# Patient Record
Sex: FEMALE | Race: WHITE | Employment: PART TIME | URBAN - METROPOLITAN AREA
[De-identification: names, ages, dates, MRNs, and addresses within clinical notes are randomized per-mention and may not be internally consistent; named-entity substitution may affect disease eponyms.]

---

## 2021-06-09 ENCOUNTER — OFFICE VISIT (OUTPATIENT)
Dept: FAMILY MEDICINE CLINIC | Age: 56
End: 2021-06-09
Payer: MEDICAID

## 2021-06-09 VITALS
BODY MASS INDEX: 19.05 KG/M2 | OXYGEN SATURATION: 97 % | RESPIRATION RATE: 17 BRPM | HEIGHT: 64 IN | SYSTOLIC BLOOD PRESSURE: 119 MMHG | DIASTOLIC BLOOD PRESSURE: 83 MMHG | WEIGHT: 111.6 LBS | HEART RATE: 75 BPM | TEMPERATURE: 97.8 F

## 2021-06-09 DIAGNOSIS — F41.9 ANXIETY: Primary | ICD-10-CM

## 2021-06-09 DIAGNOSIS — Z01.419 ENCOUNTER FOR GYNECOLOGICAL EXAMINATION WITHOUT ABNORMAL FINDING: ICD-10-CM

## 2021-06-09 DIAGNOSIS — Z12.31 ENCOUNTER FOR SCREENING MAMMOGRAM FOR MALIGNANT NEOPLASM OF BREAST: ICD-10-CM

## 2021-06-09 DIAGNOSIS — F17.218 CIGARETTE NICOTINE DEPENDENCE WITH OTHER NICOTINE-INDUCED DISORDER: ICD-10-CM

## 2021-06-09 DIAGNOSIS — Z13.6 SCREENING FOR ISCHEMIC HEART DISEASE: ICD-10-CM

## 2021-06-09 DIAGNOSIS — Z12.11 COLON CANCER SCREENING: ICD-10-CM

## 2021-06-09 DIAGNOSIS — B00.1 COLD SORE: ICD-10-CM

## 2021-06-09 PROCEDURE — 99204 OFFICE O/P NEW MOD 45 MIN: CPT | Performed by: FAMILY MEDICINE

## 2021-06-09 RX ORDER — LAMOTRIGINE 100 MG/1
100 TABLET ORAL
COMMUNITY
Start: 2021-05-06 | End: 2021-06-09 | Stop reason: SDUPTHER

## 2021-06-09 RX ORDER — VENLAFAXINE HYDROCHLORIDE 150 MG/1
150 CAPSULE, EXTENDED RELEASE ORAL DAILY
Qty: 90 CAPSULE | Refills: 1 | Status: SHIPPED | OUTPATIENT
Start: 2021-06-09 | End: 2021-11-19 | Stop reason: SDUPTHER

## 2021-06-09 RX ORDER — LAMOTRIGINE 100 MG/1
100 TABLET, EXTENDED RELEASE ORAL DAILY
Qty: 90 TABLET | Refills: 1 | Status: SHIPPED | OUTPATIENT
Start: 2021-06-09 | End: 2021-11-19 | Stop reason: SDUPTHER

## 2021-06-09 RX ORDER — ACYCLOVIR 50 MG/G
OINTMENT TOPICAL
Qty: 2 G | Refills: 3 | Status: SHIPPED | OUTPATIENT
Start: 2021-06-09

## 2021-06-09 RX ORDER — VENLAFAXINE HYDROCHLORIDE 75 MG/1
75 CAPSULE, EXTENDED RELEASE ORAL
COMMUNITY
Start: 2021-05-06 | End: 2021-06-09 | Stop reason: SDUPTHER

## 2021-06-09 NOTE — PROGRESS NOTES
1. Have you been to the ER, urgent care clinic since your last visit? Hospitalized since your last visit? Yes, [facility] for med refills. 2. Have you seen or consulted any other health care providers outside of the [address] [place] [address] since your last visit? Include any pap smears or colon screening. No    Health Maintenance Due   Topic Date Due    Hepatitis C Screening  Never done    COVID-19 Vaccine (1) Never done    DTaP/Tdap/Td series (1 - Tdap) Never done    PAP AKA CERVICAL CYTOLOGY  Never done    Lipid Screen  Never done    Shingrix Vaccine Age 50> (1 of 2) Never done    Colorectal Cancer Screening Combo  Never done    Breast Cancer Screen Mammogram  Never done     Chief Complaint   Patient presents with    Establish Care     Pt wanting to have meds refilled    Referral Request     For Psychiatrist      Pt also wants a refill on medications.

## 2021-06-09 NOTE — PROGRESS NOTES
HPI  Mara Boyd is a 50 y.o. female who presents to establish care. She has been in the area for 4 years but has not established PCP. Was going and [facility].  She has depression and anger issues. Has been stable on venlafaxine 75 and Lamictal 100 mg for 8 years. Prior to that tried a few different medications. Has been on something for a total of 12 years. She is smoking and would like to try quitting. She identifies habit as her primary barrier. She quit cold turkey for 20 years but resumed smoking when she was 22. She is currently stressed, identifies this as her living situation, lives with somebody was narcissistic and does not feel like she can relax at home. Feels jittery all the time. PMHx:  History reviewed. No pertinent past medical history. Meds:   Current Outpatient Medications   Medication Sig Dispense Refill    venlafaxine-SR (EFFEXOR-XR) 150 mg capsule Take 1 Capsule by mouth daily. 90 Capsule 1    lamoTRIgine (LaMICtal XR) 100 mg tr24 ER tablet Take 1 Tablet by mouth daily. 90 Tablet 1    acyclovir (ZOVIRAX) 5 % ointment Apply  to affected area every three (3) hours. 2 g 3       Allergies:   Not on File    Smoker:  Social History     Tobacco Use   Smoking Status Current Every Day Smoker    Packs/day: 1.00    Types: Cigarettes   Smokeless Tobacco Current User   Tobacco Comment    Pt states she needs to stop       ETOH:   Social History     Substance and Sexual Activity   Alcohol Use Never       FH:   Family History   Problem Relation Age of Onset    Diabetes Mother     Heart Disease Mother     Heart Disease Father        ROS:   As listed in HPI.  In addition:  Constitutional:   No headache, fever, fatigue, weight loss or weight gain      Cardiac:    No chest pain      Resp:   No cough or shortness of breath      Neuro   No loss of consciousness, dizziness, seizures      Physical Exam:  Blood pressure 119/83, pulse 75, temperature 97.8 °F (36.6 °C), temperature source Oral, resp. rate 17, height 5' 4\" (1.626 m), weight 111 lb 9.6 oz (50.6 kg), SpO2 97 %. GEN: No apparent distress. Alert and oriented and responds to all questions appropriately. NEUROLOGIC:  No focal neurologic deficits. Strength and sensation grossly intact. Coordination and gait grossly intact. EXT: Well perfused. No edema. SKIN: No obvious rashes. Lungs clear to auscultation bilaterally  CV regular rhythm no murmur       Assessment and Plan     Anxiety, anger  Venlafaxine 75 mgincrease this to 150  Lamictal 100 mg  She said that when she does not take her Lamictal she can feel mood swings coming on. Does not sound like true shamir but needs to be cautious about this with dose increase of the venlafaxine. Needs to see psychiatrist, she will make some calls    Refer for gynecology, plans to get Pap smears and mammograms there. She is overdue for colonoscopy. Evidently had one done in her 45s for some problem and was told to come back in 5 years. Has cold sore right upper lip on occasion, responded well to acyclovir cream in the past.    Smoking  Encouraged her to quit. I think Wellbutrin would interact with her current medications and Chantix when we were reviewing the side effects she wanted to avoid that      ICD-10-CM ICD-9-CM    1. Anxiety  F41.9 300.00 LIPID PANEL      CBC WITH AUTOMATED DIFF      METABOLIC PANEL, COMPREHENSIVE      TSH 3RD GENERATION      TSH 3RD GENERATION      METABOLIC PANEL, COMPREHENSIVE      CBC WITH AUTOMATED DIFF      LIPID PANEL   2. Screening for ischemic heart disease  Z13.6 V81.0    3. Colon cancer screening  Z12.11 V76.51 REFERRAL FOR COLONOSCOPY   4. Encounter for gynecological examination without abnormal finding  Z01.419 V72.31 REFERRAL TO OBSTETRICS AND GYNECOLOGY   5.  Encounter for screening mammogram for malignant neoplasm of breast  Z12.31 V76.12 REFERRAL TO OBSTETRICS AND GYNECOLOGY   6. Cigarette nicotine dependence with other nicotine-induced disorder  F17.218 292.89    7. Cold sore  B00.1 054.9        AVS given. Pt expressed understanding of instructions  This was a 45-minute visit. Greater than 50% of the time was spent counseling the patient on anxiety, cold sore, smoking.

## 2021-11-19 RX ORDER — LAMOTRIGINE 100 MG/1
100 TABLET, EXTENDED RELEASE ORAL DAILY
Qty: 90 TABLET | Refills: 1 | Status: SHIPPED | OUTPATIENT
Start: 2021-11-19 | End: 2022-09-06

## 2021-11-19 RX ORDER — VENLAFAXINE HYDROCHLORIDE 150 MG/1
150 CAPSULE, EXTENDED RELEASE ORAL DAILY
Qty: 90 CAPSULE | Refills: 1 | Status: SHIPPED | OUTPATIENT
Start: 2021-11-19 | End: 2022-09-06

## 2021-11-19 NOTE — TELEPHONE ENCOUNTER
Refill Request (patient calling)    Pt last seen as a new pt in 06/2021  Pt states that the provider told her that she could get her RX's filled until she found another provider? Pt would like a call when RX's are sent to the pharmacy       Requested Prescriptions     Pending Prescriptions Disp Refills    venlafaxine-SR (EFFEXOR-XR) 150 mg capsule 90 Capsule 1     Sig: Take 1 Capsule by mouth daily.  lamoTRIgine (LaMICtal XR) 100 mg tr24 ER tablet 90 Tablet 1     Sig: Take 1 Tablet by mouth daily.

## 2022-09-06 RX ORDER — LAMOTRIGINE 100 MG/1
TABLET, EXTENDED RELEASE ORAL
Qty: 30 TABLET | Refills: 0 | Status: SHIPPED | OUTPATIENT
Start: 2022-09-06

## 2022-09-06 RX ORDER — VENLAFAXINE HYDROCHLORIDE 150 MG/1
CAPSULE, EXTENDED RELEASE ORAL
Qty: 30 CAPSULE | Refills: 0 | Status: SHIPPED | OUTPATIENT
Start: 2022-09-06

## 2022-12-22 RX ORDER — VENLAFAXINE HYDROCHLORIDE 150 MG/1
CAPSULE, EXTENDED RELEASE ORAL
Qty: 30 CAPSULE | Refills: 0 | Status: SHIPPED | OUTPATIENT
Start: 2022-12-22

## 2022-12-22 RX ORDER — LAMOTRIGINE 100 MG/1
TABLET, EXTENDED RELEASE ORAL
Qty: 30 TABLET | Refills: 0 | Status: SHIPPED | OUTPATIENT
Start: 2022-12-22

## 2022-12-22 NOTE — TELEPHONE ENCOUNTER
Unable to reach pt in regards to medications refilled and follow up appt needed. Left voicemail to call back to inform of medication refills and make appt.

## 2023-02-23 ENCOUNTER — VIRTUAL VISIT (OUTPATIENT)
Dept: FAMILY MEDICINE CLINIC | Age: 58
End: 2023-02-23
Payer: MEDICAID

## 2023-02-23 DIAGNOSIS — Z12.11 COLON CANCER SCREENING: ICD-10-CM

## 2023-02-23 DIAGNOSIS — Z51.81 ENCOUNTER FOR THERAPEUTIC DRUG MONITORING: ICD-10-CM

## 2023-02-23 DIAGNOSIS — E78.2 MIXED HYPERLIPIDEMIA: ICD-10-CM

## 2023-02-23 DIAGNOSIS — Z12.31 ENCOUNTER FOR SCREENING MAMMOGRAM FOR MALIGNANT NEOPLASM OF BREAST: ICD-10-CM

## 2023-02-23 DIAGNOSIS — F41.9 ANXIETY: Primary | ICD-10-CM

## 2023-02-23 DIAGNOSIS — F17.218 CIGARETTE NICOTINE DEPENDENCE WITH OTHER NICOTINE-INDUCED DISORDER: ICD-10-CM

## 2023-02-23 RX ORDER — VENLAFAXINE HYDROCHLORIDE 150 MG/1
150 CAPSULE, EXTENDED RELEASE ORAL DAILY
Qty: 90 CAPSULE | Refills: 1 | Status: SHIPPED | OUTPATIENT
Start: 2023-02-23

## 2023-02-23 RX ORDER — LAMOTRIGINE 100 MG/1
100 TABLET, EXTENDED RELEASE ORAL DAILY
Qty: 90 TABLET | Refills: 1 | Status: SHIPPED | OUTPATIENT
Start: 2023-02-23

## 2023-02-23 NOTE — PROGRESS NOTES
Health Maintenance Due   Topic Date Due    Hepatitis C Screening  Never done    COVID-19 Vaccine (1) Never done    Pneumococcal 0-64 years (1 - PCV) Never done    Cervical cancer screen  Never done    Lipid Screen  Never done    Colorectal Cancer Screening Combo  Never done    Shingles Vaccine (1 of 2) Never done    Breast Cancer Screen Mammogram  Never done    Depression Screen  06/09/2022    Flu Vaccine (1) Never done     1. \"Have you been to the ER, urgent care clinic since your last visit? Hospitalized since your last visit? \" No    2. \"Have you seen or consulted any other health care providers outside of the [address] [place] [address] since your last visit? \" No     3. For patients aged 39-70: Has the patient had a colonoscopy / FIT/ Cologuard? No      If the patient is female:    4. For patients aged 41-77: Has the patient had a mammogram within the past 2 years? No      5. For patients aged 21-65: Has the patient had a pap smear?  No

## 2023-02-23 NOTE — PROGRESS NOTES
THIS VISIT WAS COMPLETED VIRTUALLY USING DOXY. HOWIE SALGADO  Michelet Sheldon is a 56 y.o. female who presents for medication refills. I met her once when she established care 6/2021. Prior to that she had [facility]    She has depression and anger issues. Has been stable on venlafaxine 75 and Lamictal 100 mg for 8 years. Prior to that tried a few different medications. Has been on something since about 2010    When she established care she reported that she was stressed, identifies this as her living situation, lives with somebody was narcissistic and does not feel like she can relax at home. Feels jittery all the time. Her venlafaxine was increased to 150. This was done too long ago for her to recall if any improvement with the higher dose. It sounds like her living situation is different now and she reports that she is stable and happy with her current medications so that sounds like an improvement        PMHx:  History reviewed. No pertinent past medical history. Meds:   Current Outpatient Medications   Medication Sig Dispense Refill    lamoTRIgine (LaMICtal XR) 100 mg tr24 ER tablet Take 1 Tablet by mouth daily. 90 Tablet 1    venlafaxine-SR (EFFEXOR-XR) 150 mg capsule Take 1 Capsule by mouth daily. 90 Capsule 1    acyclovir (ZOVIRAX) 5 % ointment Apply  to affected area every three (3) hours. 2 g 3       Allergies:   No Known Allergies    Smoker:  Social History     Tobacco Use   Smoking Status Every Day    Packs/day: 1.00    Types: Cigarettes   Smokeless Tobacco Never   Tobacco Comments    Pt states she needs to stop       ETOH:   Social History     Substance and Sexual Activity   Alcohol Use Not Currently       FH:   Family History   Problem Relation Age of Onset    Diabetes Mother     Heart Disease Mother     Heart Disease Father        Physical Exam:  There were no vitals taken for this visit. GEN: No apparent distress.  Alert and oriented and responds to all questions appropriately. NEUROLOGIC:  No focal neurologic deficits. Coordination and gait grossly intact. EXT: Well perfused. No edema. SKIN: No obvious rashes. Due to this being a TeleHealth evaluation, many elements of the physical examination are unable to be assessed. Assessment and Plan     Anxiety/anger  Venlafaxine 150  Lamictal 100 mg  Stable at current dose. Needs a psychiatrist.  Reports trying a few times in the getting frustrated and stopping. Refer to [address] [place] [address] psychiatry and offered to [facility] online psychiatry referral.  Sounds like she is leaning toward the [facility] for the convenience. Needs Pap smear and mammogram.  Did not make an appointment with gynecology. Thinks she will come here and see one of my female colleagues for Pap  Mammogram ordered    Overdue for colonoscopy. Had 1 done in her 45s and there was some issue, told to come back in 5 years. Strongly encouraged this    Lab work ordered, this was not done at her establish care visit because she declined lab work after 2 failed sticks. She will come in sometime next week for a lab visit. ICD-10-CM ICD-9-CM    1. Anxiety  F41.9 300.00 REFERRAL TO PSYCHIATRY      TSH 3RD GENERATION      METABOLIC PANEL, COMPREHENSIVE      CBC WITH AUTOMATED DIFF      LIPID PANEL      LAMOTRIGINE (LAMICTAL)      2. Encounter for screening mammogram for malignant neoplasm of breast  Z12.31 V76.12 SH[facility] MAMMO BI SCREENING INCL CAD      3. Colon cancer screening  Z12.11 V76.51 REFERRAL FOR COLONOSCOPY      4. Encounter for therapeutic drug monitoring  Z51.81 V58.83 LAMOTRIGINE (LAMICTAL)      5. Cigarette nicotine dependence with other nicotine-induced disorder  F17.218 292.89       6. Mixed hyperlipidemia  E78.2 272.2 TSH 3RD GENERATION      METABOLIC PANEL, COMPREHENSIVE      CBC WITH AUTOMATED DIFF      LIPID PANEL            Nicko Engel was evaluated through a synchronous (real-time) audio-video encounter.  The patient (or guardian if applicable) is aware that this is a billable service, which includes applicable co-pays. This Virtual Visit was conducted with patient's (and/or legal guardian's) consent. The visit was conducted pursuant to the emergency declaration under the [address] [place] [address], [address] [place] [address] authority and the [employer] and [employer] General Act. Patient identification was verified, and a caregiver was present when appropriate.   The patient was located at: [place]  The provider was located at: [place]